# Patient Record
Sex: FEMALE | Race: WHITE | Employment: FULL TIME | URBAN - METROPOLITAN AREA
[De-identification: names, ages, dates, MRNs, and addresses within clinical notes are randomized per-mention and may not be internally consistent; named-entity substitution may affect disease eponyms.]

---

## 2017-12-12 PROBLEM — N32.81 OAB (OVERACTIVE BLADDER): Status: ACTIVE | Noted: 2017-12-12

## 2020-07-16 ENCOUNTER — HOSPITAL ENCOUNTER (OUTPATIENT)
Dept: PHYSICAL THERAPY | Age: 40
Discharge: HOME | End: 2020-07-16
Payer: COMMERCIAL

## 2020-07-16 PROCEDURE — 97162 PT EVAL MOD COMPLEX 30 MIN: CPT

## 2020-07-16 PROCEDURE — 97110 THERAPEUTIC EXERCISES: CPT

## 2020-07-16 PROCEDURE — 97140 MANUAL THERAPY 1/> REGIONS: CPT

## 2020-07-16 NOTE — PROGRESS NOTES
PT DAILY TREATMENT NOTE 10-18    Patient Name: Jaci Ocampo  Date:2020  : 1980  [x]  Patient  Verified  Payor: Adeola Castaneda / Plan: [facility]  [facility] / Product Type: Commerical /    In time:239p  Out time:335  Total Treatment Time (min): 56  Visit #: 1 of     Medicare/BCBS Only   Total Timed Codes (min):  23 1:1 Treatment Time:  56       Treatment Area: Left knee pain [M25.562]  Knee pain, right [M25.561]    SUBJECTIVE  Pain Level (0-10 scale): 3-4  Any medication changes, allergies to medications, adverse drug reactions, diagnosis change, or new procedure performed?: [x] No    [] Yes (see summary sheet for update)  Subjective functional status/changes:   [] No changes reported  Pt initial eval today see POC for details    OBJECTIVE    Modality rationale: decrease inflammation and decrease pain to improve the patients ability to walk, squat, and navigate stairs   Min Type Additional Details    [] Estim:  []Unatt       []IFC  []Premod                        []Other:  []w/ice   []w/heat  Position:  Location:    [] Estim: []Att    []TENS instruct  []NMES                    []Other:  []w/US   []w/ice   []w/heat  Position:  Location:    []  Traction: [] Cervical       []Lumbar                       [] Prone          []Supine                       []Intermittent   []Continuous Lbs:  [] before manual  [] after manual    []  Ultrasound: []Continuous   [] Pulsed                           []1MHz   []3MHz W/cm2:  Location:    []  Iontophoresis with dexamethasone         Location: [] Take home patch   [] In clinic   3 [x]  Ice     []  heat  []  Ice massage  []  Laser   []  Anodyne Position:supine  Location: magdalene knees.     []  Laser with stim  []  Other:  Position:  Location:    []  Vasopneumatic Device Pressure:       [] lo [] med [] hi   Temperature: [] lo [] med [] hi   [] Skin assessment post-treatment:  []intact []redness- no adverse reaction    []redness  adverse reaction:     30 min [x]Eval []Re-Eval       15 min Therapeutic Exercise:  [] See flow sheet :   Rationale: increase ROM and increase strength to improve the patients ability to walk, and squat      8 min Manual Therapy:  Cross friction and light IASTM to L patellar tendon, stm and cross friction to KIERAN distal HS tendons   Rationale: decrease pain, increase ROM and increase tissue extensibility to improve tissue excursion during functional activities . With   [] TE   [] TA   [] neuro   [] other: Patient Education: [x] Review HEP    [] Progressed/Changed HEP based on:   [] positioning   [] body mechanics   [] transfers   [] heat/ice application    [] other:      Other Objective/Functional Measures:   Justification for Eval Code Complexity:  Patient History : see POC   Examination see exam   Clinical Presentation: evolving  Clinical Decision Making : FOTO : 74/100       Pain Level (0-10 scale) post treatment: 3-4    ASSESSMENT/Changes in Function: Pt was instructed in initial HEP required demo, vc, and tc for all exercises to perform correctly. Pt was given hand out detailing exercises and instructed in modification of exercises to tolerance, and in performing exercises safely. Pt verbalized understanding. Patient will continue to benefit from skilled PT services to modify and progress therapeutic interventions, address functional mobility deficits, address ROM deficits, address strength deficits, analyze and address soft tissue restrictions, analyze and cue movement patterns, analyze and modify body mechanics/ergonomics, assess and modify postural abnormalities, address imbalance/dizziness and instruct in home and community integration to attain remaining goals.      [x]  See Plan of Care  []  See progress note/recertification  []  See Discharge Summary         Progress towards goals / Updated goals:  No progress as goals were set today    PLAN  [x]  Upgrade activities as tolerated     [x]  Continue plan of care  [] Update interventions per flow sheet       []  Discharge due to:_  []  Other:_      Karly Thomas 7/16/2020  9:38 AM    Future Appointments   Date Time Provider Gilda Ocasio   7/16/2020  2:30 PM [facility]

## 2020-07-16 NOTE — PROGRESS NOTES
[address] [facility] AT [address], [address] [place] [address] Road  Phone: [phone]   [phone]  PLAN OF CARE / [address] [place] [address] PHYSICAL THERAPY SERVICES  Patient Name: Carmina Rahman : 1980   Medical   Diagnosis: Left knee pain [M25.562]  Knee pain, right [M25.561] Treatment Diagnosis: Left knee pain [M25.562]  Knee pain, right [M25.561]   Onset Date: ~1 year ago     Referral Source: Jeffery Caldwell MD Start of [facility]): 2020   Prior Hospitalization: See medical history Provider #: [id number]   Prior Level of Function: WNL, no pain with walking, prolonged sitting, or exercise   Comorbidities: Alcohol use, factor 5 clotting disorder, basal cell carcinoma removed, depressoin   Medications: Verified on Patient Summary List   The Plan of Care and following information is based on the information from the initial evaluation.   ===========================================================================================  Assessment / key information:  Pt is a 45y.o. year old F who presents with KIERAN knee pain. Signs and sx are consistent with L patellofemoral disorder, and KIERAN hamstring tendinitis. Current deficits include: dec ROM, dec strength, dec motor control and dynamic knee stability throughout LE kinetic chain. Functional deficits include: poor squat mechanics, dec ability to exercise and walk. Foto score indicates 26% functional impairment. Home exercise program initiated on initial evaluation to address these deficits. Pt would benefit from PT to address these deficits for increased functional mobility and QOL. AYUSH: Pt reports that her knees started hurting about a year ago. She thought it was due to varicose veins so she saw her vascular MD who did an ultrasound to rule out varicose vein pain. Her family MD sent her to PT.  She describes the pain as a tight feeling at the back of both knees and a sharper pain at the kneecap on the L knee. She has a history of small fracture in her R knee and KIERAN ankle fx with L healing in a boot and R requiring surgical repair many years ago. It is worse with prolonged sitting , exercise, walking. She has not tried taking any OTC medicine, heat or ice. Pt reports multiple commodities including facotr 5 which is a blood clotting disorder, hx of basal cell carcinoma, alcohol use, HTN, and depression. She denies all other red flags. OBJECTIVE:    PAIN:  Location-KIERAN knees  Current- 3-4/10  Best-1/10  Worst- 7/10  Alleviating factors: unknown  Aggravating factors: prolonged walking, sitting, exercising    MMT:  Hip   - flex L=3/5 R=3/5  - ext L=3/5 R=3/5  - abd L=3/5 R=35    Knee   - flex L=3/5! R=3/5  - ext L=3!/5 R=3/5 ( mild quad lag on L)      AROM:  Hip  Flexion- KIERAN 65 deg SLR  Extension- R= WNL, L=14  Knee: kieran WNL  Ankle- Pt R ankle demonstrates df into eversion, may be due to bony block from hx of R ankle fx and ORIF. Functional movement: squat- Pt demonstrates mild kieran knee valgus collapse with functional squat, R foot pronation. With single leg squat valgus collapse becomes more apparent.    Outcome Measure: FOTO=74    Accessory ROM: dec patellar superior glide on L    Swelling: mild point swelling along inferior pole of L patella    Palpation: TTP of L patellar tendon, KIERAN distal HS  Special Tests:  - Lachman's negative  - Posterior Drawer negative  - Varus stress test negative   - Valgus stress test negative  - Thessaly's  Positive R  - Don's sign test positive L    ===========================================================================================  Eval Complexity: History HIGH Complexity :3+ comorbidities / personal factors will impact the outcome/ POC ;  Examination  MEDIUM Complexity : 3 Standardized tests and measures addressing body structure, function, activity limitation and / or participation in recreation ; Presentation MEDIUM Complexity : Evolving with changing characteristics ; Decision Making MEDIUM Complexity : FOTO score of 26-74; Overall Complexity MEDIUM  Problem List: pain affecting function, decrease ROM, decrease strength, edema affecting function, impaired gait/ balance, decrease ADL/ functional abilitiies, decrease activity tolerance, decrease flexibility/ joint mobility and decrease transfer abilities   Treatment Plan may include any combination of the following: Therapeutic exercise, Therapeutic activities, Neuromuscular re-education, Physical agent/modality, Gait/balance training, Manual therapy, Patient education, Self Care training, Functional mobility training, Home safety training and Stair training  Patient / Family readiness to learn indicated by: asking questions  Persons(s) to be included in education: patient (P)  Barriers to Learning/Limitations: None  Measures taken, if barriers to learning:    Patient Goal (s): Stretch it out/ eliminate the pain   Patient self reported health status: good  Rehabilitation Potential: good    Short Term Goals: To be accomplished in 1 weeks:  1) Goal: Patient will be independent and compliant with HEP in order to progress toward long term goals. Status at last note/certification: issued and reviewed  Long Term Goals: To be accomplished in 8-12 treatments:  1) Goal: Patient will improve FOTO assessment score to 82 pts in order to indicate improved functional abilities. Status at last note/certification: 77UIM  2) Goal: Patient will improve L hip ext to 20 deg for proper gait mechanics  Status at last note/certification: 14 deg  3) Goal: Patient will report worst knee pain as 2/10 or less in order to progress toward personal goals. Status at last note/certification: 7/53  4) Goal: Patient will report overall at least 75% improvement in function in order to progress toward premorbid status.   Status at last note/certification: n/a  5) Goal: Patient will report ability to exercise 3+x per week without pain for general health and QOL  Status at last note/certification: moderately limited  6) Goal: Patient will demonstrate >=4/5 strength in KIERAN hips in all planes for improved ability to walk and squat  Status at last note/certification: grossly 3/5    Frequency / Duration:   Patient to be seen  1-2  times per week for 4-6  weeks:  Patient / Caregiver education and instruction: exercises  Therapist Signature: Allen Blood Date: 8/48/9343   Certification Period: na Time: 9:35 AM   ===========================================================================================  I certify that the above Physical Therapy Services are being furnished while the patient is under my care. I agree with the treatment plan and certify that this therapy is necessary. Physician Signature:        Date:       Time:     Please sign and return to [facility] or you may fax the signed copy to [phone]. Thank you.

## 2020-07-24 ENCOUNTER — HOSPITAL ENCOUNTER (OUTPATIENT)
Dept: PHYSICAL THERAPY | Age: 40
Discharge: HOME | End: 2020-07-24
Payer: COMMERCIAL

## 2020-07-24 PROCEDURE — 97140 MANUAL THERAPY 1/> REGIONS: CPT

## 2020-07-24 PROCEDURE — 97110 THERAPEUTIC EXERCISES: CPT

## 2020-07-24 NOTE — PROGRESS NOTES
PHYSICAL THERAPY - DAILY TREATMENT NOTE    Patient Name: Betiot Lucero        Date: 2020  : 1980   yes Patient  Verified  Visit #:   2   of     Insurance: Payor: Michael Santiago / Plan: [address] / Product Type: Commerical /      In time: 2:20 PM Out time: 3:24 PM   Total Treatment Time: 64     Medicare/[facility] Time Tracking (below)   Total Timed Codes (min):  54 1:1 Treatment Time:  n/a     TREATMENT AREA =  Left knee pain [M25.562]  Knee pain, right [M25.561]    SUBJECTIVE  Pain Level (on 0 to 10 scale):  4-5   10   Medication Changes/New allergies or changes in medical history, any new surgeries or procedures?    no  If yes, update Summary List   Subjective Functional Status/Changes:  []  No changes reported     Pt states posterior knees feeling a bit better; continues to have most pain to anterior knees and now increased pain to b/l anterior hips without known trigger/cause.           OBJECTIVE  Modalities Rationale:    decrease inflammation and decrease pain to improve the patients ability to walk, squat, and navigate stairs   min [] Estim, type/location:                                      []  att     []  unatt     []  w/US     []  w/ice    []  w/heat    min []  Mechanical Traction: type/lbs                   []  pro   []  sup   []  int   []  cont    []  before manual    []  after manual    min []  Ultrasound, settings/location:      min []  Iontophoresis w/ dexamethasone, location:                                               []  take home patch       []  in clinic   10 min [x]  Ice     []  Heat    location/position: B/l knees; supine    min []  Vasopneumatic Device, press/temp:     min []  Other:    [] Skin assessment post-treatment (if applicable):    []  intact    []  redness- no adverse reaction     []redness  adverse reaction:        39 min Therapeutic Exercise:  [x]  See flow sheet   Rationale:    increase ROM and increase strength to improve the patients ability to walk, and squat       15 min Manual Therapy: Cross friction L patellar tendon. Grade 3 sup/inf patellar mobs. STM to quad tendon. Ktape to L patellar tendon \"U\" strip and horizontal strip for patellar tendon offloading; pt ed on precautions for monitoring skin integrity and safe removal if any signs of irritation; pt verbalizes understanding and agreement. Rationale:    decrease pain, increase ROM and increase tissue extensibility to improve tissue excursion during functional activities . Billed With/As:   [x] TE   [] TA   [] Neuro   [] Self Care Patient Education: [x] Review HEP    [] Progressed/Changed HEP based on:   [] positioning   [] body mechanics   [] transfers   [] heat/ice application    [] other:      Other Objective/Functional Measures:  -pt initially unable to tolerate SLR LLE; after manual therapy able to perform 10 reps SLR painfree.  -noted moderate edema to infrapatellar fat pad L    -new exercises added as per flow sheet with vc's provided for form/technique 100% of the time. Post Treatment Pain Level (on 0 to 10) scale:   2  / 10     ASSESSMENT  Assessment/Changes in Function:     Isometric QS on left with good patellar tracking noted. Pt notes significant pain improvement post treatment. Pt ed to avoid plyometric/tabata/HIIT programs until cleared with PT to avoid pain exacerbation.   Pt states she will be in town 2 weeks and then out of town for 2 weeks and wants to ensure able to be as functional as possible before then.     []  See Progress Note/Recertification   Patient will continue to benefit from skilled PT services to modify and progress therapeutic interventions, address functional mobility deficits, address ROM deficits, address strength deficits, analyze and address soft tissue restrictions, analyze and cue movement patterns, analyze and modify body mechanics/ergonomics, assess and modify postural abnormalities, address imbalance/dizziness and instruct in home and community integration to attain remaining goals. Progress toward goals / Updated goals:    1st f/u; no significant changes yet      Short Term Goals: To be accomplished in 1 weeks:  1) Goal: Patient will be independent and compliant with HEP in order to progress toward long term goals. Status at last note/certification: issued and reviewed  [address] [employer] be accomplished in 8-12 treatments:  1) Goal: Patient will improve FOTO assessment score to 82 pts in order to indicate improved functional abilities. Status at last note/certification: 43QEU  2) Goal: Patient will improve L hip ext to 20 deg for proper gait mechanics  Status at last note/certification: 14 deg  3) Goal: Patient will report worst knee pain as 2/10 or less in order to progress toward personal goals. Status at last note/certification: 3/69  4) Goal: Patient will report overall at least 75% improvement in function in order to progress toward premorbid status. Status at last note/certification: n/a  5) Goal: Patient will report ability to exercise 3+x per week without pain for general health and QOL  Status at last note/certification: moderately limited  6) Goal: Patient will demonstrate >=4/5 strength in KIERAN hips in all planes for improved ability to walk and squat  Status at last note/certification: grossly 3/5     PLAN  []  Upgrade activities as tolerated yes Continue plan of care   []  Discharge due to :    []  Other:      Therapist: Juan Diego Nicholas.  Mercie Dubin, [facility]    Date: 7/24/2020 Time: 2:18 PM     Future Appointments   Date Time Provider Gilda Ocasio   7/28/2020  1:15 PM Nilda Schlatter., PT [facility]   7/31/2020 10:00 AM [facility] PT [facility]3/2020  1:45 PM [facility] PT [facility] 2 [facility]   8/5/2020 12:00 PM Nilda Schlatter., PT [facility]   8/19/2020  2:15 PM Rhina Clrake, PT [facility]   8/21/2020  2:00 PM Ruby John PTA [facility]

## 2020-07-28 ENCOUNTER — HOSPITAL ENCOUNTER (OUTPATIENT)
Dept: PHYSICAL THERAPY | Age: 40
Discharge: HOME | End: 2020-07-28
Payer: COMMERCIAL

## 2020-07-28 PROCEDURE — 97110 THERAPEUTIC EXERCISES: CPT

## 2020-07-28 PROCEDURE — 97140 MANUAL THERAPY 1/> REGIONS: CPT

## 2020-07-28 NOTE — PROGRESS NOTES
PHYSICAL THERAPY - DAILY TREATMENT NOTE    Patient Name: Jenna Fix        Date: 2020  : 1980   yes Patient  Verified  Visit #:   3     Insurance: Payor: Berenice Kang / Plan: [address] PT / Product Type: Commerical /      In time: 1:20 PM Out time: 2:32   Total Treatment Time: 72     Medicare/[facility] Time Tracking (below)   Total Timed Codes (min):  n/a 1:1 Treatment Time:  n/a     TREATMENT AREA =  Left knee pain [M25.562]  Knee pain, right [M25.561]    SUBJECTIVE  Pain Level (on 0 to 10 scale):  4  / 10   Medication Changes/New allergies or changes in medical history, any new surgeries or procedures?    no  If yes, update Summary List   Subjective Functional Status/Changes:  []  No changes reported     \"It was pretty good on Friday and Saturday until the afternoon\"  Thinks the [place] was helpful; no irritation to skin noted. Came off on  due to tape beginning to lift off.        OBJECTIVE  Modalities Rationale:    decrease inflammation and decrease pain to improve the patients ability to walk, squat, and navigate stairs   min [] Estim, type/location:                                      []  att     []  unatt     []  w/US     []  w/ice    []  w/heat    min []  Mechanical Traction: type/lbs                   []  pro   []  sup   []  int   []  cont    []  before manual    []  after manual    min []  Ultrasound, settings/location:      min []  Iontophoresis w/ dexamethasone, location:                                               []  take home patch       []  in clinic   10 min [x]  Ice     []  Heat    location/position: B/l knees; supine    min []  Vasopneumatic Device, press/temp:     min []  Other:    [] Skin assessment post-treatment (if applicable):    []  intact    []  redness- no adverse reaction     []redness  adverse reaction:        45 min Therapeutic Exercise:  [x]  See flow sheet (-5 min bike warm up)   Rationale:    increase ROM and increase strength to improve the patients ability to walk, and squat       12 min Manual Therapy: Cross friction L patellar tendon. Grade 3 sup/inf patellar mobs. STM to quad tendon and distal hamstrings b/l.     Ktape to L patellar tendon \"U\" strip and horizontal strip for patellar tendon offloading; pt ed on precautions for monitoring skin integrity and safe removal if any signs of irritation; pt verbalizes understanding and agreement. Rationale:    decrease pain, increase ROM and increase tissue extensibility to improve tissue excursion during functional activities . Billed With/As:   [x] TE   [] TA   [] Neuro   [] Self Care Patient Education: [x] Review HEP    [] Progressed/Changed HEP based on:   [] positioning   [] body mechanics   [] transfers   [] heat/ice application    [] other:      Other Objective/Functional Measures:  -increased resistance with 3 way hip by placing band at ankles. -standing squat to ~45 deg knee flexion with pt noting \"pull\" at pan-lateral L knee  -progressed toe scrunch and arch raise to standing; poor form with arch raise despite cues  -added TG squats, DL/SL  -added R eccentric LAQ, L SAQ.  -added b/l piriformis stretches, dead bug Lv2    Knee circumference: 34.4 cm;  5 cm inf. 31.5 cm. LLE       Post Treatment Pain Level (on 0 to 10) scale:   2  / 10     ASSESSMENT  Assessment/Changes in Function:     Pt unable to tolerate L LAQ due to c/o lateral inferior knee pain with TKE. Pt initially with lateral joint pain on attempted SLR LLE; after manual pain reported to medial knee joint; able to perform pain free with VMO bias. Pt ed to continue limiting running/jumping activities. Plan continue progressing strength as tolerated.      []  See Progress Note/Recertification   Patient will continue to benefit from skilled PT services to modify and progress therapeutic interventions, address functional mobility deficits, address ROM deficits, address strength deficits, analyze and address soft tissue restrictions, analyze and cue movement patterns, analyze and modify body mechanics/ergonomics, assess and modify postural abnormalities, address imbalance/dizziness and instruct in home and community integration to attain remaining goals. Progress toward goals / Updated goals:    Short Term Goals: To be accomplished in 1 weeks:  1) Goal: Patient will be independent and compliant with HEP in order to progress toward long term goals. Status at last note/certification: issued and reviewed. -7/28: goal met and ongoing; pt reports compliance. Long Term Goals: To be accomplished in 8-12 treatments:  1) Goal: Patient will improve FOTO assessment score to 82 pts in order to indicate improved functional abilities. Status at last note/certification: 77BPF  2) Goal: Patient will improve L hip ext to 20 deg for proper gait mechanics  Status at last note/certification: 14 deg  3) Goal: Patient will report worst knee pain as 2/10 or less in order to progress toward personal goals. Status at last note/certification: 1/81  4) Goal: Patient will report overall at least 75% improvement in function in order to progress toward premorbid status. Status at last note/certification: n/a  5) Goal: Patient will report ability to exercise 3+x per week without pain for general health and QOL  Status at last note/certification: moderately limited  6) Goal: Patient will demonstrate >=4/5 strength in KIERAN hips in all planes for improved ability to walk and squat  Status at last note/certification: grossly 3/5     PLAN  []  Upgrade activities as tolerated yes Continue plan of care   []  Discharge due to :    []  Other:      Therapist: Mahesh Irwin, PT    Date: 7/28/2020 Time: 2:27 PM      Future Appointments   Date Time Provider Gilda Ocasio   7/28/2020  1:15 PM Mary Chaudhary, PT [facility]   7/31/2020 10:00 AM [facility] PT [facility]ED [facility]OND 1 [facility]   8/3/2020  1:45 PM [facility] PT CHILLED POND 2 [facility]   8/5/2020 12:00 PM Michela Kirby, PT [facility]   8/19/2020  2:15 PM Flavio Barber, PT [facility]   8/21/2020  2:00 PM Randy Dunham, GWENDOLYN [facility]

## 2020-07-31 ENCOUNTER — HOSPITAL ENCOUNTER (OUTPATIENT)
Dept: PHYSICAL THERAPY | Age: 40
Discharge: HOME | End: 2020-07-31
Payer: COMMERCIAL

## 2020-07-31 PROCEDURE — 97140 MANUAL THERAPY 1/> REGIONS: CPT

## 2020-07-31 PROCEDURE — 97110 THERAPEUTIC EXERCISES: CPT

## 2020-07-31 NOTE — PROGRESS NOTES
PHYSICAL THERAPY - DAILY TREATMENT NOTE    Patient Name: Harjinder Rodriguez        Date: 2020  : 1980   yes Patient  Verified  Visit #:   4     Insurance: Payor: Morenita Fish / Plan: [facility] / Product Type: Commerical /      In time: 10:04 Out time: 11:08   Total Treatment Time: 64     Medicare/[facility] Time Tracking (below)   Total Timed Codes (min):  n/a 1:1 Treatment Time:  n/a     TREATMENT AREA =  Left knee pain [M25.562]  Knee pain, right [M25.561]    SUBJECTIVE  Pain Level (on 0 to 10 scale):  3  / 10 bilateral knees   Medication Changes/New allergies or changes in medical history, any new surgeries or procedures?    no  If yes, update Summary List   Subjective Functional Status/Changes:  [x]  No changes reported   Pt reports overall she feels like she is improving. Patient informed that insurance does not cover orthotics, cost will be OOP. Patient is still interested in orthotics, will see Glenna Pacheco on 2020.        OBJECTIVE  Modalities Rationale:    decrease inflammation and decrease pain to improve the patients ability to walk, squat, and navigate stairs   min [] Estim, type/location:                                      []  att     []  unatt     []  w/US     []  w/ice    []  w/heat    min []  Mechanical Traction: type/lbs                   []  pro   []  sup   []  int   []  cont    []  before manual    []  after manual    min []  Ultrasound, settings/location:      min []  Iontophoresis w/ dexamethasone, location:                                               []  take home patch       []  in clinic   10 min [x]  Ice     []  Heat    location/position: B/l knees; supine    min []  Vasopneumatic Device, press/temp:     min []  Other:    [x] Skin assessment post-treatment (if applicable):    [x]  intact    []  redness- no adverse reaction     []redness  adverse reaction:        42 min Therapeutic Exercise:  [x]  See flow sheet (-5 min bike warm up)   Rationale:    increase ROM and increase strength to improve the patients ability to walk, and squat       12 min Manual Therapy: Cross friction L patellar tendon. Grade 3 sup/inf patellar mobs. STM to L quad tendon and distal hamstrings. Rationale:    decrease pain, increase ROM and increase tissue extensibility to improve tissue excursion during functional activities . Billed With/As:   [x] TE   [] TA   [] Neuro   [] Self Care Patient Education: [x] Review HEP    [] Progressed/Changed HEP based on:   [] positioning   [] body mechanics   [] transfers   [] heat/ice application    [] other:      Other Objective/Functional Measures:   - no pain with TE  - YMB at ankles for side step, monster walk and 3 way hip  - squats with YMB at knees  - increased to 15 reps on TG squats both DL and SL  - continued pain in left knee joint with LLE SLR       Knee circumference: 34.5 cm;  5 cm inf. 30.5 cm. LLE   Post Treatment Pain Level (on 0 to 10) scale:   0  / 10     ASSESSMENT  Assessment/Changes in Function:   Patient presents with chief c/o of continued pain in bilateral knees, reporting she feels like the location of the pain changes in the left knee. Light progression of TE as noted on flowsheet. Patient has been educated on self-application of k-tape and reports she is comfortable performing as needed at home.      []  See Progress Note/Recertification   Patient will continue to benefit from skilled PT services to modify and progress therapeutic interventions, address functional mobility deficits, address ROM deficits, address strength deficits, analyze and address soft tissue restrictions, analyze and cue movement patterns, analyze and modify body mechanics/ergonomics, assess and modify postural abnormalities, address imbalance/dizziness and instruct in home and community integration to attain remaining goals.    Progress toward goals / Updated goals:    Short Term Goals: To be accomplished in 1 weeks:  1) Goal: Patient will be independent and compliant with HEP in order to progress toward long term goals. Status at last note/certification: issued and reviewed. -7/28: goal met and ongoing; pt reports compliance. Long Term Goals: To be accomplished in 8-12 treatments:  1) Goal: Patient will improve FOTO assessment score to 82 pts in order to indicate improved functional abilities. Status at last note/certification: 03WCN  2) Goal: Patient will improve L hip ext to 20 deg for proper gait mechanics  Status at last note/certification: 14 deg  3) Goal: Patient will report worst knee pain as 2/10 or less in order to progress toward personal goals. Status at last note/certification: 5/37  4) Goal: Patient will report overall at least 75% improvement in function in order to progress toward premorbid status.   Status at last note/certification: n/a  5) Goal: Patient will report ability to exercise 3+x per week without pain for general health and QOL  Status at last note/certification: moderately limited  6) Goal: Patient will demonstrate >=4/5 strength in KIERAN hips in all planes for improved ability to walk and squat  Status at last note/certification: grossly 3/5     PLAN  []  Upgrade activities as tolerated yes Continue plan of care   []  Discharge due to :    []  Other:      Therapist: LULU Cummings    Date: 7/31/2020 Time: 11:08 AM      Future Appointments   Date Time Provider Gilda Ocasio   7/31/2020 10:00 AM [facility] [place] [facility]   8/3/2020  1:45 PM [facility] PT [place] 2 [facility]   8/5/2020 12:00 PM Brunilda Davila, PT [facility]   8/19/2020  2:15 PM Nica Caldwell PT [facility]   8/21/2020  2:00 PM Ti Serrano, PTA [facility]

## 2020-08-03 ENCOUNTER — HOSPITAL ENCOUNTER (OUTPATIENT)
Dept: PHYSICAL THERAPY | Age: 40
Discharge: HOME | End: 2020-08-03
Payer: COMMERCIAL

## 2020-08-03 PROCEDURE — 97110 THERAPEUTIC EXERCISES: CPT | Performed by: GENERAL ACUTE CARE HOSPITAL

## 2020-08-03 PROCEDURE — 97140 MANUAL THERAPY 1/> REGIONS: CPT | Performed by: GENERAL ACUTE CARE HOSPITAL

## 2020-08-05 ENCOUNTER — HOSPITAL ENCOUNTER (OUTPATIENT)
Dept: PHYSICAL THERAPY | Age: 40
Discharge: HOME | End: 2020-08-05
Payer: COMMERCIAL

## 2020-08-05 PROCEDURE — 97110 THERAPEUTIC EXERCISES: CPT

## 2020-08-05 PROCEDURE — 97140 MANUAL THERAPY 1/> REGIONS: CPT

## 2020-08-05 NOTE — PROGRESS NOTES
PHYSICAL THERAPY - DAILY TREATMENT NOTE    Patient Name: Garfield Carmona        Date: 2020  : 1980   yes Patient  Verified  Visit #:     Insurance: Payor: Ajith Estrada / [facility]: [address] [facility] [facility] / Product Type: Commerical /      In time: 12:10 PM Out time: 1:10   Total Treatment Time: 60     Medicare/[facility] Time Tracking (below)   Total Timed Codes (min):  n/a 1:1 Treatment Time:  n/a     TREATMENT AREA =  Left knee pain [M25.562]  Knee pain, right [M25.561]    SUBJECTIVE  Pain Level (on 0 to 10 scale):  1  / 10   Medication Changes/New allergies or changes in medical history, any new surgeries or procedures?    no  If yes, update Summary List   Subjective Functional Status/Changes:  [x]  No changes reported     Pt states her knee pain is improving. She doesn't have pain with sitting to her knees, though her hips will get irritated. She has most pain/trouble with straightening her leg (LAQ).   Did a tabata class this AM including burpees; denies pain       OBJECTIVE  Modalities Rationale:    decrease inflammation and decrease pain to improve the patients ability to walk, squat, and navigate stairs   min [] Estim, type/location:                                      []  att     []  unatt     []  w/US     []  w/ice    []  w/heat    min []  Mechanical Traction: type/lbs                   []  pro   []  sup   []  int   []  cont    []  before manual    []  after manual    min []  Ultrasound, settings/location:      min []  Iontophoresis w/ dexamethasone, location:                                               []  take home patch       []  in clinic   10 min [x]  Ice     []  Heat    location/position: B/l knees; supine    min []  Vasopneumatic Device, press/temp:     min []  Other:    [x] Skin assessment post-treatment (if applicable):    [x]  intact    []  redness- no adverse reaction     []redness  adverse reaction:        40 min Therapeutic Exercise:  [x]  See flow sheet    Rationale: increase ROM and increase strength to improve the patients ability to walk, and squat       10 min Manual Therapy: IASTM b/l quad/patellar tendons. Grade 3 sup/inf patellar mobs. Rationale:    decrease pain, increase ROM and increase tissue extensibility to improve tissue excursion during functional activities . Billed With/As:   [x] TE   [] TA   [] Neuro   [] Self Care Patient Education: [x] Review HEP    [] Progressed/Changed HEP based on:   [] positioning   [] body mechanics   [] transfers   [] heat/ice application    [x] other: No Charge:  Pt fitted and issued for b/l semi-custom orthotics; vasyli. Pt ed on appropriate wear including weaning in period as needed. R midfoot pronation still occurring minimally in orthotic and discussed wearing x 1 week with possible adjustment for increased MLA support as needed at NV     Other Objective/Functional Measures:   - squat with band with glute strategy and pt c/o min R knee discomfort limiting knee flexion to ~ 40 deg.  -progressed to GMB for band exercises; pt notes b/l knee pain with last reps of bridges and unable to tolerate with clams; performed clams without band today. -TC for some exercises deferred to HEP today    L knee circumferential measurements  33.8 at joint line; 31 cm 5 cm inf to joint     Post Treatment Pain Level (on 0 to 10) scale:   0  / 10      ASSESSMENT  Assessment/Changes in Function:     Pt with decreased edema noted to b/l knees and significantly reduced pain c/o with normal activities indicating good progress. Pt continues to have limitations to tolerance for higher level exercises and advised to d/c jumping/excessie squatting type exercises. Pt will be out of town visiting family in [facility] for a couple of weeks.      []  See Progress Note/Recertification   Patient will continue to benefit from skilled PT services to modify and progress therapeutic interventions, address functional mobility deficits, address ROM deficits, address strength deficits, analyze and address soft tissue restrictions, analyze and cue movement patterns, analyze and modify body mechanics/ergonomics, assess and modify postural abnormalities, address imbalance/dizziness and instruct in home and community integration to attain remaining goals. Progress toward goals / Updated goals:    Short Term Goals: To be accomplished in 1 weeks:  1) Goal: Patient will be independent and compliant with HEP in order to progress toward long term goals. Status at last note/certification: issued and reviewed. -7/28: goal met and ongoing; pt reports compliance. Long Term Goals: To be accomplished in 8-12 treatments:  1) Goal: Patient will improve FOTO assessment score to 82 pts in order to indicate improved functional abilities. Status at last note/certification: 63LVF  2) Goal: Patient will improve L hip ext to 20 deg for proper gait mechanics  Status at last note/certification: 14 deg  3) Goal: Patient will report worst knee pain as 2/10 or less in order to progress toward personal goals. Status at last note/certification: 3/64.-1/9: goal met; reports max pain this week 2/10  4) Goal: Patient will report overall at least 75% improvement in function in order to progress toward premorbid status. Status at last note/certification: n/a -8/5: goal met: 85% improvement reported  5) Goal: Patient will report ability to exercise 3+x per week without pain for general health and QOL  Status at last note/certification: moderately limited  6) Goal: Patient will demonstrate >=4/5 strength in KIERAN hips in all planes for improved ability to walk and squat  Status at last note/certification: grossly 3/5     PLAN  [x]  Upgrade activities as tolerated yes Continue plan of care   []  Discharge due to :    []  Other:      Therapist: Sarai Allen.  Kevin Henry, PT    Date: 8/5/2020 Time: 1:11 PM      Future Appointments   Date Time Provider Gilda Ocasio   8/5/2020 12:00 PM Anabelle Holbrook., PT [facility]   8/21/2020  2:00 PM Betzaida Herndon PTA [facility]   8/25/2020 11:00 AM Shaneka Amaral [facility]

## 2020-08-19 ENCOUNTER — APPOINTMENT (OUTPATIENT)
Dept: PHYSICAL THERAPY | Age: 40
End: 2020-08-19
Payer: COMMERCIAL

## 2020-08-21 ENCOUNTER — HOSPITAL ENCOUNTER (OUTPATIENT)
Dept: PHYSICAL THERAPY | Age: 40
Discharge: HOME | End: 2020-08-21
Payer: COMMERCIAL

## 2020-08-21 PROCEDURE — 97110 THERAPEUTIC EXERCISES: CPT

## 2020-08-21 PROCEDURE — 97140 MANUAL THERAPY 1/> REGIONS: CPT

## 2020-08-21 NOTE — PROGRESS NOTES
PT DAILY TREATMENT NOTE     Patient Name: Diego Nielson  Date:2020  : 1980  [x]  Patient  Verified  Payor: Paola Simpson / Plan: [facility] [employer]  [facility] / Product Type: Commerical /    In time:2:10  Out time:3:42  Total Treatment Time (min): 92  Total Timed Codes (min): 77  1:1 Treatment Time (min):    Visit #: 7 of     Treatment Area: Left knee pain [M25.562]  Knee pain, right [M25.561]    SUBJECTIVE  Pain Level (0-10 scale): 2/10  Any medication changes, allergies to medications, adverse drug reactions, diagnosis change, or new procedure performed?: [x] No    [] Yes (see summary sheet for update)  Subjective functional status/changes:   [] No changes reported  My hips have been hurting more than my knees lately, I mainly just feel the tension above my knee caps and behind my knees.     OBJECTIVE  Modality rationale: decrease inflammation and decrease pain to improve the patients ability to improve functional abilities    Min Type Additional Details    [] Estim: []Att   []Unatt  []TENS instruct                 []IFC  []Premod []NMES                       []Other:  []w/US   []w/ice   []w/heat  Position:  Location:    []  Traction: [] Cervical       []Lumbar                       [] Prone          []Supine                       []Intermittent   []Continuous Lbs:  [] before manual  [] after manual    []  Ultrasound: []Continuous   [] Pulsed                           []1MHz   []3MHz Location:  W/cm2:    []  Iontophoresis with dexamethasone         Location: [] Take home patch   [] In clinic   10 [x]  Ice     []  heat  []  Ice massage Position:long sitting   Location:B knees    []  Vasopneumatic Device Pressure: [] lo [] med [] hi   Temp: [] lo [] med [] hi   [] Skin assessment post-treatment:  []intact []redness- no adverse reaction       []redness  adverse reaction:       67 min Therapeutic Exercise:  [x] See flow sheet :   Rationale: increase ROM, increase strength, improve balance and increase proprioception to improve the patients ability to improve functional abilities    15 min Manual Therapy:  Instrument assisted soft tissue mobilization applied to bilateral quad/patella tendons and popliteal fossa regions using GT-3   Rationale: decrease pain, increase ROM, increase tissue extensibility and decrease trigger points to improve functional mobility            min Patient Education: [x] Review HEP    [] Progressed/Changed HEP based on:   [] positioning   [] body mechanics   [] transfers   [] heat/ice application        Other Objective/Functional Measures:     Pain Level (0-10 scale) post treatment: 0    ASSESSMENT/Changes in Function:     Patient will continue to benefit from skilled PT services to modify and progress therapeutic interventions, address functional mobility deficits, address ROM deficits, address strength deficits, analyze and address soft tissue restrictions, analyze and cue movement patterns and instruct in home and community integration to attain remaining goals. []  See Plan of Care  [x]  See progress note/recertification  []  See Discharge Summary         Progress towards goals / Updated goals:  See Progress note/Physician update for full detailed progress towards established goals.     PLAN  [x]  Upgrade activities as tolerated     []  Continue plan of care  []  Update interventions per flow sheet       []  Discharge due to:_  []  Other:_      Enrrique Miranda, GWENDOLYN 8/21/2020  2:15 PM      Future Appointments   Date Time Provider Gilda Ocasio   8/25/2020 11:00 AM Celso Tanner [facility]

## 2020-08-21 NOTE — PROGRESS NOTES
[facility] PHYSICAL THERAPY  [address], [place], [address] - Phone: [phone]  Fax: [phone]  PROGRESS NOTE  Patient Name: Ely France : 1980   Treatment/Medical Diagnosis: Left knee pain [M25.562]  Knee pain, right [M25.561]   Referral Source: Nolan Santana MD     Date of Initial Visit: 20 Attended Visits: 7 Missed Visits: 0     SUMMARY OF TREATMENT  . Therapeutic exercise for LE/knee focused strengthening and stability, proprioceptive/balance awareness, LE biomechanical symmetry, core stabilization, manual intervention, cryotherapy, patient education and HEP. CURRENT STATUS  Patient reports approximately 70-75% overall improvement from therapy since initial evaluation with 2/10 pain level on average, increased to 4/10 at the worst after trial with running on the treadmill as well as after prolonged walking. Pt presents with chief c/o increased mild bilateral hip soreness/pain from focusing on hip/glut strengthening over the past several visits. Pt is making steady progress with advancing with hip/knee strengthening and stabilization within current POC. Pt would benefit from continued therapy for 5 remaining visits left on current script to achieve maximum medical benefit/potential from current POC. Will continue to progress/advance patient within current POC as tolerated with monitoring symptoms. Strength measurements/MMT= (R/L)= Hip= Flexion=4/5, 4+/5; Abduction=4/5 bilaterally; Extension=4/5, 4-/5; Glut Medius=4+/5, 4/5; Knee= Quads=4+/5 bilaterally, Hamstrings=5/5 bilaterally   Goal/Measure of Progress Goal Met? 1. Patient will improve FOTO assessment score to 82 pts in order to indicate improved functional abilities. Status at last Eval: 74/100 Current Status: 75/100 no   2. Patient will improve L hip ext to 20 deg for proper gait mechanics   Status at last Eval: R= WNL, L=14 Current Status: WFL's bilaterally yes   3. Patient will report worst knee pain as 2/10 or less in order to progress toward personal goals. Status at last Eval: 7/10 with prolonged walking, sitting, exercising at the worst  Current Status: increased to 4/10 at the worst after trial with running on the treadmill as well as after prolonged walking. no       Goal/Measure of Progress Goal Met? 4. Patient will report overall at least 75% improvement in function in order to progress toward premorbid status. Status at last Eval: Progressing  Current Status: 70-75% improvement reported yes   5. Patient will report ability to exercise 3+x per week without pain for general health and QOL   Status at last Eval: Progressing Current Status: Met, no pain in knees, but increased pain/soreness in bilateral hips yes   6. Patient will demonstrate >=4/5 strength in KIERAN hips in all planes for improved ability to walk and squat   Status at last Eval: Hip   - flex L=3/5 R=3/5  - ext L=3/5 R=3/5  - abd L=3/5 R=35 Current Status: Hip   - flex L=4+/5 R=4/5  - ext L=4-/5 R=4/5  - abd L=4/5 R=4/5 yes     New Goals to be achieved in __5_  treatments:  1. Patient will improve FOTO assessment score to 82 pts in order to indicate improved functional abilities. 2. Patient will report worst knee pain as 2/10 or less in order to progress toward personal goals. 3. Patient will be able to advance to light to moderate plyometric based program as well as treadmill intervals for return to running for return to premorbid workout status. RECOMMENDATIONS  Continue with current POC for 5 remaining visits left on current script with advancing as tolerated, then reassess for the need for continuation or discharge from therapy. If you have any questions/comments please contact us directly at [phone]. Thank you for allowing us to assist in the care of your patient.     Therapist Signature: Morgan Benson PTA Date: 8/21/2020    Chica Ward DPT Time: 2:35 PM   NOTE TO PHYSICIAN:  PLEASE COMPLETE THE ORDERS BELOW AND FAX TO   [facility]: [phone]  If you are unable to process this request in 24 hours please contact our office: [phone]    ___ I have read the above report and request that my patient continue as recommended.   ___ I have read the above report and request that my patient continue therapy with the following changes/special instructions:_________________________________________________________   ___ I have read the above report and request that my patient be discharged from therapy.      Physician Signature:        Date:       Time:

## 2020-08-25 ENCOUNTER — HOSPITAL ENCOUNTER (OUTPATIENT)
Dept: PHYSICAL THERAPY | Age: 40
Discharge: HOME | End: 2020-08-25
Payer: COMMERCIAL

## 2020-08-25 PROCEDURE — 97110 THERAPEUTIC EXERCISES: CPT

## 2020-08-25 PROCEDURE — 97140 MANUAL THERAPY 1/> REGIONS: CPT

## 2020-08-25 NOTE — PROGRESS NOTES
PT DAILY TREATMENT NOTE     Patient Name: Ely France  Date:2020  : 1980  [x]  Patient  Verified  Payor: Beatriz Brown / Plan: [employer]  CAPITATED PT / Product Type: Commerical /    In time:11:15 Out time:12:11  Total Treatment Time (min): 56  Total Timed Codes (min): 46  1:1 Treatment Time (min):    Visit #: 8 of     Treatment Area: Left knee pain [M25.562]  Knee pain, right [M25.561]    SUBJECTIVE  Pain Level (0-10 scale): 2/10  Any medication changes, allergies to medications, adverse drug reactions, diagnosis change, or new procedure performed?: [x] No    [] Yes (see summary sheet for update)  Subjective functional status/changes:   [] No changes reported  Pt reports that her knees have been slowly getting better. Her hips get achy.      OBJECTIVE  Modality rationale: decrease inflammation and decrease pain to improve the patients ability to improve functional abilities    Min Type Additional Details    [] Estim: []Att   []Unatt  []TENS instruct                 []IFC  []Premod []NMES                       []Other:  []w/US   []w/ice   []w/heat  Position:  Location:    []  Traction: [] Cervical       []Lumbar                       [] Prone          []Supine                       []Intermittent   []Continuous Lbs:  [] before manual  [] after manual    []  Ultrasound: []Continuous   [] Pulsed                           []1MHz   []3MHz Location:  W/cm2:    []  Iontophoresis with dexamethasone         Location: [] Take home patch   [] In clinic   10 [x]  Ice     []  heat  []  Ice massage Position:long sitting   Location:B knees    []  Vasopneumatic Device Pressure: [] lo [] med [] hi   Temp: [] lo [] med [] hi   [] Skin assessment post-treatment:  []intact []redness- no adverse reaction       []redness  adverse reaction:       36 min Therapeutic Exercise:  [x] See flow sheet :   Rationale: increase ROM, increase strength, improve balance and increase proprioception to improve the patients ability to improve functional abilities    10 min Manual Therapy:   soft tissue mobilization applied to bilateral quad/patella tendons, prone manual hip IR/ER stretching   Rationale: decrease pain, increase ROM, increase tissue extensibility and decrease trigger points to improve functional mobility            min Patient Education: [x] Review HEP    [] Progressed/Changed HEP based on:   [] positioning   [] body mechanics   [] transfers   [] heat/ice application        Other Objective/Functional Measures: Added box jumps, squat jumps/hops, mod side plank with hip abd  Pain Level (0-10 scale) post treatment: 0    ASSESSMENT/Changes in Function:   Pt was able to tolerate progressions in exercises today to improve dynamic strength for return to running. Pt required vc and mirror to improve form and dec knee valgus collapse. Pt had inc valgus collapse with box jumps at initiation and landing that improved with vc. Pt was instructed in sidelying hip abd and modified side plank with hip abd as clams tend to irritate her hips. She responded well to the exercises reporting them more tolerable than clams however, they were not pain free. PT plans to add in seated 90/90 hip ER/IR stretching to improve hip mobility. Patient will continue to benefit from skilled PT services to modify and progress therapeutic interventions, address functional mobility deficits, address ROM deficits, address strength deficits, analyze and address soft tissue restrictions, analyze and cue movement patterns and instruct in home and community integration to attain remaining goals. []  See Plan of Care  [x]  See progress note/recertification  []  See Discharge Summary         Progress towards goals / Updated goals:  1. Patient will improve FOTO assessment score to 82 pts in order to indicate improved functional abilities. 2. Patient will report worst knee pain as 2/10 or less in order to progress toward personal goals.   3. Patient will be able to advance to light to moderate plyometric based program as well as treadmill intervals for return to running for return to premorbid workout status.     PLAN  [x]  Upgrade activities as tolerated     [x]  Continue plan of care  []  Update interventions per flow sheet       []  Discharge due to:_  []  Other:_      Tristen James 8/25/2020  2:15 PM      Future Appointments   Date Time Provider Gilda Ocasio   8/25/2020 11:00 AM Franchesca Sahu [facility]

## 2020-09-03 ENCOUNTER — HOSPITAL ENCOUNTER (OUTPATIENT)
Dept: PHYSICAL THERAPY | Age: 40
Discharge: HOME | End: 2020-09-03
Payer: COMMERCIAL

## 2020-09-03 PROCEDURE — 97140 MANUAL THERAPY 1/> REGIONS: CPT

## 2020-09-03 PROCEDURE — 97110 THERAPEUTIC EXERCISES: CPT

## 2020-09-03 NOTE — PROGRESS NOTES
PT DAILY TREATMENT NOTE     Patient Name: Flip De León  NYMV:[id number]  : 1980  [x]  Patient  Verified  Payor: Keyur Hopkins / Plan: [employer]  CAPITATED PT / Product Type: Commerical /    In time:4:25 Out time:5:42  Total Treatment Time (min): 77  Total Timed Codes (min): 67  1:1 Treatment Time (min):    Visit #: 9 of     Treatment Area: Left knee pain [M25.562]  Knee pain, right [M25.561]    SUBJECTIVE  Pain Level (0-10 scale): 0/10  Any medication changes, allergies to medications, adverse drug reactions, diagnosis change, or new procedure performed?: [x] No    [] Yes (see summary sheet for update)  Subjective functional status/changes:   [] No changes reported  Pt reports her knees are fine today, she has not yet returned to running her knees seem to get sore every now and then but she is not sure why, she still has some soreness in the R lateral hamstring     OBJECTIVE  Modality rationale: decrease inflammation and decrease pain to improve the patients ability to improve functional abilities    Min Type Additional Details    [] Estim: []Att   []Unatt  []TENS instruct                 []IFC  []Premod []NMES                       []Other:  []w/US   []w/ice   []w/heat  Position:  Location:    []  Traction: [] Cervical       []Lumbar                       [] Prone          []Supine                       []Intermittent   []Continuous Lbs:  [] before manual  [] after manual    []  Ultrasound: []Continuous   [] Pulsed                           []1MHz   []3MHz Location:  W/cm2:    []  Iontophoresis with dexamethasone         Location: [] Take home patch   [] In clinic   10 [x]  Ice     []  heat  []  Ice massage Position:long sitting   Location:B knees    []  Vasopneumatic Device Pressure: [] lo [] med [] hi   Temp: [] lo [] med [] hi   [] Skin assessment post-treatment:  []intact []redness- no adverse reaction       []redness  adverse reaction:       57 min Therapeutic Exercise:  [x] See flow sheet : Rationale: increase ROM, increase strength, improve balance and increase proprioception to improve the patients ability to improve functional abilities    10 min Manual Therapy:   soft tissue mobilization applied to bilateral quad/patella tendons, IBL IT bands and distal HS   Rationale: decrease pain, increase ROM, increase tissue extensibility and decrease trigger points to improve functional mobility            min Patient Education: [x] Review HEP    [] Progressed/Changed HEP based on:   [] positioning   [] body mechanics   [] transfers   [] heat/ice application        Other Objective/Functional Measures: Added single leg squat to table with band, 90/90 seated hip stretch, SLS jump/lunge, SLS paloff press, self ankle mobilization, psoas march  Pain Level (0-10 scale) post treatment: 0    ASSESSMENT/Changes in Function:   Pt was instructed in 90/90 seated hip stretch to improve hip mobility with squats and sporting activities, and single leg squat with ABD band at knees to improve hip abd activation with SLS activities, SLS paloff press and [place] planks to improve core stability, and self ankle emobolization to improve ankle DF with squats. Pt responded well to advanced exercises reporting no inc in knee sx, only mm fatigue. Pt was noted to have dec DF on R ankle likely due to hx of fx, pt was instructed in self DF mob to improve ankle ROM for improved squats. Pt was given updated HEP detailing exercises from today. See chart for copy  Pt was also instructed to atempt short (.25-.5 mile) run over the weekend to assess knee function    Patient will continue to benefit from skilled PT services to modify and progress therapeutic interventions, address functional mobility deficits, address ROM deficits, address strength deficits, analyze and address soft tissue restrictions, analyze and cue movement patterns and instruct in home and community integration to attain remaining goals.      []  See Plan of Care  [x]  See progress note/recertification  []  See Discharge Summary         Progress towards goals / Updated goals:  1. Patient will improve FOTO assessment score to 82 pts in order to indicate improved functional abilities. 2. Patient will report worst knee pain as 2/10 or less in order to progress toward personal goals. 3. Patient will be able to advance to light to moderate plyometric based program as well as treadmill intervals for return to running for return to premorbid workout status.     PLAN  [x]  Upgrade activities as tolerated     [x]  Continue plan of care  []  Update interventions per flow sheet       []  Discharge due to:_  []  Other:_      Marv Birmingham 9/3/2020  2:15 PM      Future Appointments   Date Time Provider Gilda Ocasio   9/3/2020  4:15 PM Skip [facility]Aala [address]   9/9/2020  4:30 PM [facility] [address]

## 2020-09-09 ENCOUNTER — HOSPITAL ENCOUNTER (OUTPATIENT)
Dept: PHYSICAL THERAPY | Age: 40
Discharge: HOME | End: 2020-09-09
Payer: COMMERCIAL

## 2020-09-09 PROCEDURE — 97110 THERAPEUTIC EXERCISES: CPT

## 2020-09-09 PROCEDURE — 97140 MANUAL THERAPY 1/> REGIONS: CPT

## 2020-09-09 NOTE — PROGRESS NOTES
PT DAILY TREATMENT NOTE     Patient Name: Jenna Fix  Date:2020  : 1980  [x]  Patient  Verified  Payor: Berenice Kang / Plan: [employer]  CAPITABRINDA PT / Product Type: Commerical /    In time:4:35  Out time:5:44  Total Treatment Time (min): 69  Total Timed Codes (min): 63  1:1 Treatment Time (min):    Visit #: 10 of     Treatment Area: Left knee pain [M25.562]  Knee pain, right [M25.561]    SUBJECTIVE  Pain Level (0-10 scale): 0  Any medication changes, allergies to medications, adverse drug reactions, diagnosis change, or new procedure performed?: [x] No    [] Yes (see summary sheet for update)  Subjective functional status/changes:   [] No changes reported  I have been feeling more pain behind both of my knees when I am doing a lot of walking or even when I sit for a long period of time sometimes. OBJECTIVE      53 min Therapeutic Exercise:  [x] See flow sheet :bike x 6 mins N/C   Rationale: increase ROM, increase strength, improve balance and increase proprioception to improve the patients ability to improve functional abilities     16 min Manual Therapy:  STM/tissue mobs and Instrument assisted soft tissue mobilization applied to L ITB and bilateral popliteal fossa regions using GT-1&3   Rationale: decrease pain, increase ROM, increase tissue extensibility, decrease trigger points and increase postural awareness to improve functional mobility            min Patient Education: [x] Review HEP    [] Progressed/Changed HEP based on:   [] positioning   [] body mechanics   [] transfers   [] heat/ice application        Other Objective/Functional Measures:     Pain Level (0-10 scale) post treatment: 0    ASSESSMENT/Changes in Function: Pt presented with chief c/o L distal posterolateral ITB and bilateral popliteal fossa region tension/symptoms which was focused on with manual intervention with good relief today.  Pt was also able to advance to gym stick lunges and lateral tap downs on 4\" step with good biomechanical symmetry/form bilaterally. Will continue to progress/advance patient within current POC as tolerated with monitoring symptoms. Patient will continue to benefit from skilled PT services to modify and progress therapeutic interventions, address functional mobility deficits, address ROM deficits, address strength deficits, analyze and address soft tissue restrictions, analyze and cue movement patterns and instruct in home and community integration to attain remaining goals. []  See Plan of Care  []  See progress note/recertification  []  See Discharge Summary         Progress towards goals / Updated goals:  1. Patient will improve FOTO assessment score to 82 pts in order to indicate improved functional abilities. 2. Patient will report worst knee pain as 2/10 or less in order to progress toward personal goals. 9/9/20: Met  3. Patient will be able to advance to light to moderate plyometric based program as well as treadmill intervals for return to running for return to premorbid workout status. PLAN  [x]  Upgrade activities as tolerated     []  Continue plan of care  []  Update interventions per flow sheet       []  Discharge due to:_  []  Other:_      Mary Hernandez, GWENDOLYN 9/9/2020  4:42 PM      No future appointments.

## 2020-10-28 NOTE — PROGRESS NOTES
[address] AT [address], [address] [place] [address] Road  Phone: [phone]   [phone]    DISCHARGE NOTE  Patient Name: Mary Kay Saba : 1980   Treatment/Medical Diagnosis: Left knee pain [M25.562]  Knee pain, right [M25.561]   Referral Source: Darshana German MD     Date of Initial Visit: 20 Attended Visits: 10 Missed Visits: 0       SUMMARY OF TREATMENT  Pt attended initial evaluation and 9 follow-up appointments. Unfortunately, patient failed to return for further treatment after completing 10 of 8-12 prescribed visits and is therefore discharged from our care at this time. See progress note done on 20 for full detailed progress towards all established goals. RECOMMENDATIONS  Discontinue physical therapy due to patient not returning. If you have any questions/comments please contact us directly at [phone]. Thank you for allowing us to assist in the care of your patient.     Therapist Signature: Ronelle Schwab, PTA Date: 10/28/20    Ezequiel Pelaez DPMARY Time: 2:24 PM